# Patient Record
Sex: MALE | URBAN - METROPOLITAN AREA
[De-identification: names, ages, dates, MRNs, and addresses within clinical notes are randomized per-mention and may not be internally consistent; named-entity substitution may affect disease eponyms.]

---

## 2019-02-20 ENCOUNTER — NEW PATIENT (OUTPATIENT)
Dept: URBAN - METROPOLITAN AREA CLINIC 24 | Facility: CLINIC | Age: 84
End: 2019-02-20
Payer: COMMERCIAL

## 2019-02-20 DIAGNOSIS — E11.9 TYPE 2 DIABETES MELLITUS WITHOUT COMPLICATIONS: Primary | ICD-10-CM

## 2019-02-20 DIAGNOSIS — H52.4 PRESBYOPIA: ICD-10-CM

## 2019-02-20 DIAGNOSIS — H35.372 PUCKERING OF MACULA, LEFT EYE: ICD-10-CM

## 2019-02-20 PROCEDURE — 92004 COMPRE OPH EXAM NEW PT 1/>: CPT | Performed by: OPTOMETRIST

## 2019-02-20 PROCEDURE — 92015 DETERMINE REFRACTIVE STATE: CPT | Performed by: OPTOMETRIST

## 2019-02-20 PROCEDURE — 92134 CPTRZ OPH DX IMG PST SGM RTA: CPT | Performed by: OPTOMETRIST

## 2019-02-20 ASSESSMENT — INTRAOCULAR PRESSURE
OD: 16
OS: 16

## 2019-02-20 ASSESSMENT — VISUAL ACUITY
OS: 20/30
OD: 20/20

## 2019-02-20 ASSESSMENT — KERATOMETRY
OS: 44.48
OD: 44.80

## 2020-12-09 ENCOUNTER — FOLLOW UP ESTABLISHED (OUTPATIENT)
Dept: URBAN - METROPOLITAN AREA CLINIC 24 | Facility: CLINIC | Age: 85
End: 2020-12-09
Payer: COMMERCIAL

## 2020-12-09 DIAGNOSIS — Z79.84 LONG TERM (CURRENT) USE OF ORAL HYPOGLYCEMIC DRUGS: ICD-10-CM

## 2020-12-09 DIAGNOSIS — H26.491 OTHER SECONDARY CATARACT, RIGHT EYE: ICD-10-CM

## 2020-12-09 DIAGNOSIS — H04.123 DRY EYE SYNDROME OF BILATERAL LACRIMAL GLANDS: ICD-10-CM

## 2020-12-09 DIAGNOSIS — H35.3131 NONEXUDATIVE MACULAR DEGENERATION, EARLY DRY STAGE, BILATERAL: ICD-10-CM

## 2020-12-09 PROCEDURE — 92250 FUNDUS PHOTOGRAPHY W/I&R: CPT | Performed by: OPTOMETRIST

## 2020-12-09 PROCEDURE — 92014 COMPRE OPH EXAM EST PT 1/>: CPT | Performed by: OPTOMETRIST

## 2020-12-09 ASSESSMENT — INTRAOCULAR PRESSURE
OS: 22
OD: 21

## 2020-12-09 ASSESSMENT — VISUAL ACUITY
OD: 20/40
OS: 20/30

## 2020-12-09 ASSESSMENT — KERATOMETRY
OD: 45.47
OS: 44.95

## 2020-12-15 ENCOUNTER — NEW PATIENT (OUTPATIENT)
Dept: URBAN - METROPOLITAN AREA CLINIC 24 | Facility: CLINIC | Age: 85
End: 2020-12-15
Payer: MEDICARE

## 2020-12-15 DIAGNOSIS — Z01.818 ENCOUNTER FOR OTHER PREPROCEDURAL EXAMINATION: Primary | ICD-10-CM

## 2020-12-15 PROCEDURE — 99202 OFFICE O/P NEW SF 15 MIN: CPT | Performed by: PHYSICIAN ASSISTANT

## 2020-12-22 ENCOUNTER — SURGERY (OUTPATIENT)
Dept: URBAN - METROPOLITAN AREA SURGERY 12 | Facility: SURGERY | Age: 85
End: 2020-12-22
Payer: MEDICARE

## 2020-12-22 PROCEDURE — 66821 AFTER CATARACT LASER SURGERY: CPT | Performed by: OPHTHALMOLOGY

## 2021-01-08 ENCOUNTER — FOLLOW UP ESTABLISHED (OUTPATIENT)
Dept: URBAN - METROPOLITAN AREA CLINIC 24 | Facility: CLINIC | Age: 86
End: 2021-01-08
Payer: MEDICARE

## 2021-01-08 PROCEDURE — 99213 OFFICE O/P EST LOW 20 MIN: CPT | Performed by: OPTOMETRIST

## 2021-01-08 ASSESSMENT — KERATOMETRY
OD: 44.41
OS: 45.04

## 2021-01-08 ASSESSMENT — VISUAL ACUITY
OD: 20/25
OS: 20/30

## 2021-01-08 ASSESSMENT — INTRAOCULAR PRESSURE
OS: 20
OD: 19

## 2022-06-22 ENCOUNTER — OFFICE VISIT (OUTPATIENT)
Dept: URBAN - METROPOLITAN AREA CLINIC 24 | Facility: CLINIC | Age: 87
End: 2022-06-22
Payer: COMMERCIAL

## 2022-06-22 DIAGNOSIS — H02.834 DERMATOCHALASIS OF LEFT UPPER EYELID: ICD-10-CM

## 2022-06-22 DIAGNOSIS — H35.372 PUCKERING OF MACULA, LEFT EYE: ICD-10-CM

## 2022-06-22 DIAGNOSIS — H35.3131 NONEXUDATIVE MACULAR DEGENERATION, EARLY DRY STAGE, BILATERAL: Primary | ICD-10-CM

## 2022-06-22 DIAGNOSIS — H52.4 PRESBYOPIA: ICD-10-CM

## 2022-06-22 DIAGNOSIS — H02.831 DERMATOCHALASIS OF RIGHT UPPER EYELID: ICD-10-CM

## 2022-06-22 DIAGNOSIS — H43.813 VITREOUS DEGENERATION, BILATERAL: ICD-10-CM

## 2022-06-22 DIAGNOSIS — H26.492 OTHER SECONDARY CATARACT, LEFT EYE: ICD-10-CM

## 2022-06-22 DIAGNOSIS — H04.123 TEAR FILM INSUFFICIENCY OF BILATERAL LACRIMAL GLANDS: ICD-10-CM

## 2022-06-22 PROCEDURE — 92014 COMPRE OPH EXAM EST PT 1/>: CPT | Performed by: OPTOMETRIST

## 2022-06-22 PROCEDURE — 92134 CPTRZ OPH DX IMG PST SGM RTA: CPT | Performed by: OPTOMETRIST

## 2022-06-22 ASSESSMENT — VISUAL ACUITY
OS: 20/20
OD: 20/50

## 2022-06-22 ASSESSMENT — INTRAOCULAR PRESSURE
OD: 19
OS: 20

## 2022-06-22 NOTE — IMPRESSION/PLAN
Impression: Presbyopia: H52.4. Plan: New srx released per patient request. 
Understands BCVA limited.

## 2022-06-22 NOTE — IMPRESSION/PLAN
Impression: Dermatochalasis of right upper eyelid: H02.831. Plan: Pt not interested in BULB at this time.

## 2022-12-19 ENCOUNTER — TESTING ONLY (OUTPATIENT)
Dept: URBAN - METROPOLITAN AREA CLINIC 24 | Facility: CLINIC | Age: 87
End: 2022-12-19
Payer: MEDICARE

## 2022-12-19 DIAGNOSIS — H52.4 PRESBYOPIA: ICD-10-CM

## 2022-12-19 DIAGNOSIS — H04.123 TEAR FILM INSUFFICIENCY OF BILATERAL LACRIMAL GLANDS: Primary | ICD-10-CM

## 2022-12-19 PROCEDURE — 92015 DETERMINE REFRACTIVE STATE: CPT | Performed by: OPTOMETRIST

## 2022-12-19 PROCEDURE — 92012 INTRM OPH EXAM EST PATIENT: CPT | Performed by: OPTOMETRIST

## 2022-12-19 ASSESSMENT — VISUAL ACUITY
OD: 20/30
OS: 20/30

## 2022-12-19 NOTE — IMPRESSION/PLAN
Impression: Tear film insufficiency of bilateral lacrimal glands: H04.123. Plan: Resume artificial tears at least 4-6 times a day and gel drop or tear ointment at bedtime. 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 Pt has thus far been non-compliant with recommendations.

## 2022-12-19 NOTE — IMPRESSION/PLAN
Impression: Presbyopia: H52.4. Plan: Minimal improvement with refraction. 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 
Pt understands, however, is adamant he can see well in the distance through bifocal of current srx. New srx matching bifocal rx released per patient request. NO RX CHECKS.